# Patient Record
Sex: MALE | Race: WHITE
[De-identification: names, ages, dates, MRNs, and addresses within clinical notes are randomized per-mention and may not be internally consistent; named-entity substitution may affect disease eponyms.]

---

## 2020-06-06 ENCOUNTER — HOSPITAL ENCOUNTER (EMERGENCY)
Dept: HOSPITAL 77 - KA.ED | Age: 35
Discharge: HOME | End: 2020-06-06
Payer: SELF-PAY

## 2020-06-06 DIAGNOSIS — F17.210: ICD-10-CM
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
## 2020-06-06 DIAGNOSIS — S52.124A: Primary | ICD-10-CM

## 2020-06-06 DIAGNOSIS — E66.9: ICD-10-CM

## 2020-06-06 NOTE — CR
______________________________________________________________________________   

  

[id number] RAD/RAD Forearm Right 2V  

Exam: RAD Forearm Right 2V  

   

 Indication:INJURY.  

   

 Comparison: No prior imaging for comparison.  

   

 Discussion:   

   

 Negative for fracture. Reticulations of the elbow and wrist appear intact.  

   

 Impression:  

   

 Negative examination.  

   

 Electronically signed by Eduardo Garcia MD on 6/6/2020 5:16 PM  

   

  

Eduardo Garcia MD                 

 06/06/20 [id number]    

  

Thank you for allowing us to participate in the care of your patient.

## 2020-06-06 NOTE — CR
______________________________________________________________________________   

  

[id number] RAD/RAD Elbow Right 2V  

Exam: RAD Elbow Right 2V  

   

 Indication: Pain after injury.  

   

 Comparison: No prior imaging for comparison.  

   

 Discussion:   

   

 Subtle linear lucency in the radial head seen on the AP view. Nondisplaced  

 fracture is possible. Lateral view demonstrates elbow joint effusion.  

   

 Radiocapitellar and ulnotrochlear articulations are in normal alignment.  

   

 Impression:  

   

 Nondisplaced radial head fracture and elbow joint effusion.  

   

 Electronically signed by Eduardo Garcia MD on 6/6/2020 5:48 PM  

   

  

Eduardo Garcia MD                 

 06/06/20 [id number]    

  

Thank you for allowing us to participate in the care of your patient.
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
